# Patient Record
Sex: MALE | Race: WHITE | NOT HISPANIC OR LATINO | URBAN - METROPOLITAN AREA
[De-identification: names, ages, dates, MRNs, and addresses within clinical notes are randomized per-mention and may not be internally consistent; named-entity substitution may affect disease eponyms.]

---

## 2017-01-19 ENCOUNTER — IMPORTED ENCOUNTER (OUTPATIENT)
Dept: URBAN - METROPOLITAN AREA CLINIC 50 | Facility: CLINIC | Age: 47
End: 2017-01-19

## 2021-01-08 NOTE — PATIENT DISCUSSION
Continue current management. No additional PLAVIX needed per MD, pt already received his am dose prior to cathlab procedure

## 2021-04-17 ASSESSMENT — VISUAL ACUITY
OS_CC: J1
OD_CC: J1
OS_SC: 20/20
OD_SC: 20/20

## 2021-04-17 ASSESSMENT — TONOMETRY
OD_IOP_MMHG: 15
OS_IOP_MMHG: 16